# Patient Record
Sex: FEMALE
[De-identification: names, ages, dates, MRNs, and addresses within clinical notes are randomized per-mention and may not be internally consistent; named-entity substitution may affect disease eponyms.]

---

## 2017-08-28 ENCOUNTER — APPOINTMENT (OUTPATIENT)
Dept: INTERNAL MEDICINE | Facility: CLINIC | Age: 27
End: 2017-08-28
Payer: COMMERCIAL

## 2017-08-28 ENCOUNTER — LABORATORY RESULT (OUTPATIENT)
Age: 27
End: 2017-08-28

## 2017-08-28 VITALS
OXYGEN SATURATION: 99 % | HEART RATE: 77 BPM | SYSTOLIC BLOOD PRESSURE: 108 MMHG | HEIGHT: 63.5 IN | BODY MASS INDEX: 21.7 KG/M2 | DIASTOLIC BLOOD PRESSURE: 62 MMHG | WEIGHT: 124 LBS | TEMPERATURE: 98.6 F

## 2017-08-28 DIAGNOSIS — Z80.6 FAMILY HISTORY OF LEUKEMIA: ICD-10-CM

## 2017-08-28 DIAGNOSIS — Z83.42 FAMILY HISTORY OF FAMILIAL HYPERCHOLESTEROLEMIA: ICD-10-CM

## 2017-08-28 DIAGNOSIS — Z97.3 PRESENCE OF SPECTACLES AND CONTACT LENSES: ICD-10-CM

## 2017-08-28 PROCEDURE — 99213 OFFICE O/P EST LOW 20 MIN: CPT | Mod: 25

## 2017-08-28 PROCEDURE — 99385 PREV VISIT NEW AGE 18-39: CPT | Mod: 25

## 2017-08-28 PROCEDURE — 36415 COLL VENOUS BLD VENIPUNCTURE: CPT

## 2017-08-28 RX ORDER — EZETIMIBE 10 MG/1
10 TABLET ORAL
Refills: 0 | Status: ACTIVE | COMMUNITY

## 2017-08-28 RX ORDER — DOXYCYCLINE HYCLATE 100 MG/1
100 CAPSULE ORAL
Qty: 10 | Refills: 0 | Status: COMPLETED | COMMUNITY
Start: 2017-03-01

## 2017-08-28 RX ORDER — AZITHROMYCIN 500 MG/1
500 TABLET, FILM COATED ORAL
Qty: 3 | Refills: 0 | Status: COMPLETED | COMMUNITY
Start: 2017-02-28

## 2017-08-29 LAB
25(OH)D3 SERPL-MCNC: 31 NG/ML
ALBUMIN SERPL ELPH-MCNC: 4.7 G/DL
ALP BLD-CCNC: 67 U/L
ALT SERPL-CCNC: 29 U/L
ANION GAP SERPL CALC-SCNC: 19 MMOL/L
AST SERPL-CCNC: 41 U/L
BASOPHILS # BLD AUTO: 0.06 K/UL
BASOPHILS NFR BLD AUTO: 0.9 %
BILIRUB SERPL-MCNC: 0.2 MG/DL
BUN SERPL-MCNC: 17 MG/DL
CALCIUM SERPL-MCNC: 9.6 MG/DL
CHLORIDE SERPL-SCNC: 102 MMOL/L
CHOLEST SERPL-MCNC: 221 MG/DL
CHOLEST/HDLC SERPL: 4 RATIO
CK SERPL-CCNC: 134 U/L
CO2 SERPL-SCNC: 22 MMOL/L
CREAT SERPL-MCNC: 0.62 MG/DL
EOSINOPHIL # BLD AUTO: 0.24 K/UL
EOSINOPHIL NFR BLD AUTO: 3.5 %
GLUCOSE SERPL-MCNC: 66 MG/DL
HCT VFR BLD CALC: 32.8 %
HDLC SERPL-MCNC: 55 MG/DL
HGB BLD-MCNC: 8.7 G/DL
LDLC SERPL CALC-MCNC: 150 MG/DL
LYMPHOCYTES # BLD AUTO: 1.06 K/UL
LYMPHOCYTES NFR BLD AUTO: 15.7 %
MAN DIFF?: NORMAL
MCHC RBC-ENTMCNC: 18.4 PG
MCHC RBC-ENTMCNC: 26.5 GM/DL
MCV RBC AUTO: 69.3 FL
MONOCYTES # BLD AUTO: 0.41 K/UL
MONOCYTES NFR BLD AUTO: 6.1 %
NEUTROPHILS # BLD AUTO: 4.91 K/UL
NEUTROPHILS NFR BLD AUTO: 73 %
PLATELET # BLD AUTO: 427 K/UL
POTASSIUM SERPL-SCNC: 5.3 MMOL/L
PROT SERPL-MCNC: 7.5 G/DL
RBC # BLD: 4.73 M/UL
RBC # FLD: 24.7 %
SODIUM SERPL-SCNC: 143 MMOL/L
TRIGL SERPL-MCNC: 80 MG/DL
TSH SERPL-ACNC: 1.11 UIU/ML
WBC # FLD AUTO: 6.73 K/UL

## 2017-09-25 ENCOUNTER — RX RENEWAL (OUTPATIENT)
Age: 27
End: 2017-09-25

## 2018-05-22 ENCOUNTER — APPOINTMENT (OUTPATIENT)
Dept: INTERNAL MEDICINE | Facility: CLINIC | Age: 28
End: 2018-05-22
Payer: COMMERCIAL

## 2018-05-22 VITALS
HEIGHT: 63 IN | SYSTOLIC BLOOD PRESSURE: 100 MMHG | HEART RATE: 73 BPM | TEMPERATURE: 97.3 F | WEIGHT: 129 LBS | DIASTOLIC BLOOD PRESSURE: 65 MMHG | OXYGEN SATURATION: 98 % | BODY MASS INDEX: 22.86 KG/M2

## 2018-05-22 DIAGNOSIS — Z71.89 OTHER SPECIFIED COUNSELING: ICD-10-CM

## 2018-05-22 PROCEDURE — 99214 OFFICE O/P EST MOD 30 MIN: CPT

## 2018-05-22 RX ORDER — DOXYCYCLINE HYCLATE 100 MG/1
100 CAPSULE ORAL DAILY
Qty: 30 | Refills: 2 | Status: COMPLETED | COMMUNITY
Start: 2017-08-28 | End: 2018-05-22

## 2018-05-22 NOTE — PHYSICAL EXAM
[No Acute Distress] : no acute distress [Well Nourished] : well nourished [Well Developed] : well developed [Well-Appearing] : well-appearing [Normal Sclera/Conjunctiva] : normal sclera/conjunctiva [Normal Outer Ear/Nose] : the outer ears and nose were normal in appearance [No Joint Swelling] : no joint swelling [No Rash] : no rash [Normal Gait] : normal gait [No Focal Deficits] : no focal deficits [Memory Grossly Normal] : memory grossly normal [Normal Affect] : the affect was normal [Alert and Oriented x3] : oriented to person, place, and time [Normal Mood] : the mood was normal [Normal Insight/Judgement] : insight and judgment were intact

## 2018-05-22 NOTE — COUNSELING
[Behavioral health counseling provided] : behavioral health  [Engage in a relaxing activity] : Engage in a relaxing activity [Good understanding] : Patient has a good understanding of lifestyle changes and the steps needed to achieve self management goals

## 2018-05-22 NOTE — ASSESSMENT
[FreeTextEntry1] : 26 y/o female is here for f/u on insomnia. We spent a long time discussing the different classes of sleep aides and how they work with regard to treating comobidities.\par She has no psych history and I don't think BENZOs are a good option here. She doesn't like AMbien and is thus hesitant to try Lunesta.\par Remeron isn't a great drug in her demographic.\par That leaves us with Trazadone which she would like to try.

## 2018-05-22 NOTE — HEALTH RISK ASSESSMENT
[No falls in past year] : Patient reported no falls in the past year [0] : 2) Feeling down, depressed, or hopeless: Not at all (0) [[id number]] : 0

## 2018-05-22 NOTE — HISTORY OF PRESENT ILLNESS
[de-identified] : 28 y/o female with insomnia is here for f/u.\par SHe had CPE in 8/17. \par SHe was given Ambien in August. She takes it several times per week but doesn't like how she feels the next am=groggy, etc.\par She finds melatonin, cherry extract, Unisom, Benadryl ineffective.\par She wants a "different class of medications," She saw an advertisement for remeron on TV. \par She has no psych history and no sxs of depression/anxiety.\par \par

## 2018-06-29 ENCOUNTER — MOBILE ON CALL (OUTPATIENT)
Age: 28
End: 2018-06-29

## 2018-07-02 ENCOUNTER — APPOINTMENT (OUTPATIENT)
Dept: INTERNAL MEDICINE | Facility: CLINIC | Age: 28
End: 2018-07-02
Payer: COMMERCIAL

## 2018-07-02 VITALS
RESPIRATION RATE: 14 BRPM | DIASTOLIC BLOOD PRESSURE: 70 MMHG | OXYGEN SATURATION: 98 % | TEMPERATURE: 98.3 F | SYSTOLIC BLOOD PRESSURE: 110 MMHG | HEART RATE: 80 BPM

## 2018-07-02 PROCEDURE — 99213 OFFICE O/P EST LOW 20 MIN: CPT

## 2018-07-02 RX ORDER — ZOLPIDEM TARTRATE 5 MG/1
5 TABLET ORAL
Qty: 30 | Refills: 5 | Status: COMPLETED | COMMUNITY
Start: 2017-08-28 | End: 2018-07-02

## 2018-07-02 RX ORDER — DROSPIRENONE, ETHINYL ESTRADIOL AND LEVOMEFOLATE CALCIUM AND LEVOMEFOLATE CALCIUM 3-0.02(24)
3-0.02-0.451 KIT ORAL
Qty: 28 | Refills: 0 | Status: COMPLETED | COMMUNITY
Start: 2016-07-15 | End: 2018-07-02

## 2018-07-02 NOTE — PHYSICAL EXAM
[No Acute Distress] : no acute distress [Well Nourished] : well nourished [Normal Sclera/Conjunctiva] : normal sclera/conjunctiva [Normal Gait] : normal gait [Normal Affect] : the affect was normal [Alert and Oriented x3] : oriented to person, place, and time

## 2018-07-02 NOTE — HISTORY OF PRESENT ILLNESS
[de-identified] : 28 y/o female with insomnia is here for f/u. She increased her trazodone to 100 mg and it is very effective. SHe is here now for refills. She has no other complaints.

## 2018-08-28 ENCOUNTER — OTHER (OUTPATIENT)
Age: 28
End: 2018-08-28

## 2018-08-29 ENCOUNTER — RX RENEWAL (OUTPATIENT)
Age: 28
End: 2018-08-29

## 2018-12-08 ENCOUNTER — MOBILE ON CALL (OUTPATIENT)
Age: 28
End: 2018-12-08

## 2018-12-11 ENCOUNTER — RX RENEWAL (OUTPATIENT)
Age: 28
End: 2018-12-11

## 2019-02-06 ENCOUNTER — RX RENEWAL (OUTPATIENT)
Age: 29
End: 2019-02-06

## 2019-02-28 ENCOUNTER — RX RENEWAL (OUTPATIENT)
Age: 29
End: 2019-02-28

## 2019-03-14 ENCOUNTER — APPOINTMENT (OUTPATIENT)
Dept: INTERNAL MEDICINE | Facility: CLINIC | Age: 29
End: 2019-03-14
Payer: COMMERCIAL

## 2019-03-14 VITALS
SYSTOLIC BLOOD PRESSURE: 100 MMHG | HEART RATE: 50 BPM | DIASTOLIC BLOOD PRESSURE: 58 MMHG | OXYGEN SATURATION: 99 % | HEIGHT: 63 IN | TEMPERATURE: 97.5 F | BODY MASS INDEX: 21.68 KG/M2 | WEIGHT: 122.38 LBS

## 2019-03-14 DIAGNOSIS — Z82.61 FAMILY HISTORY OF ARTHRITIS: ICD-10-CM

## 2019-03-14 PROCEDURE — 99395 PREV VISIT EST AGE 18-39: CPT | Mod: 25

## 2019-03-14 PROCEDURE — 99213 OFFICE O/P EST LOW 20 MIN: CPT | Mod: 25

## 2019-03-14 PROCEDURE — 36415 COLL VENOUS BLD VENIPUNCTURE: CPT

## 2019-03-14 NOTE — COUNSELING
[Weight management counseling provided] : Weight management [Healthy eating counseling provided] : healthy eating [Activity counseling provided] : activity [Behavioral health counseling provided] : behavioral health  [Fall prevention counseling provided] : fall prevention  [None] : None

## 2019-03-14 NOTE — HISTORY OF PRESENT ILLNESS
[de-identified] : 27 y/o female is here for CPE.\par Takes multiple lipid lowering agents given by cardiology.\par Takes Trazodone nightly for insomnia. Has developed dry eyes. OPhtho trying "drops" and different contacts. No dry mouth, rash, joint pains. Mom has RA.

## 2019-03-14 NOTE — REVIEW OF SYSTEMS
[Negative] : Heme/Lymph [Pain] : no pain [Redness] : no redness [Dryness] : dryness  [Itching] : no itching

## 2019-03-14 NOTE — HEALTH RISK ASSESSMENT
[Very Good] : ~his/her~  mood as very good [Patient reported PAP Smear was normal] : Patient reported PAP Smear was normal [0] : 2) Feeling down, depressed, or hopeless: Not at all (0) [[id number]] : 0 [Change in mental status noted] : No change in mental status noted [Language] : denies difficulty with language [Behavior] : denies difficulty with behavior [Learning/Retaining New Information] : denies difficulty learning/retaining new information [Handling Complex Tasks] : denies difficulty handling complex tasks [Reasoning] : denies difficulty with reasoning [Spatial Ability and Orientation] : denies difficulty with spatial ability and orientation [PapSmearDate] : 07/18

## 2019-03-14 NOTE — ASSESSMENT
[FreeTextEntry1] : 28 year is here for a CPE. She was counselled on diet and exercise, drug and alcohol use, age appropriate health care maintenance including vaccines, seatbelts, sunscreen, stress reduction and safe sex. All questions asked/answered to the best of my ability.\par Labs sent including autoimmune panel given dry eye.\par Dry eye could also be side effect of the trazodone which I refilled for her.

## 2019-03-18 LAB
25(OH)D3 SERPL-MCNC: 22.1 NG/ML
ALBUMIN SERPL ELPH-MCNC: 5 G/DL
ALP BLD-CCNC: 58 U/L
ALT SERPL-CCNC: 33 U/L
ANION GAP SERPL CALC-SCNC: 19 MMOL/L
AST SERPL-CCNC: 35 U/L
BASOPHILS # BLD AUTO: 0.03 K/UL
BASOPHILS NFR BLD AUTO: 0.9 %
BILIRUB SERPL-MCNC: 0.2 MG/DL
BUN SERPL-MCNC: 18 MG/DL
CALCIUM SERPL-MCNC: 9.7 MG/DL
CHLORIDE SERPL-SCNC: 104 MMOL/L
CHOLEST SERPL-MCNC: 103 MG/DL
CHOLEST/HDLC SERPL: 2 RATIO
CO2 SERPL-SCNC: 16 MMOL/L
CREAT SERPL-MCNC: 0.49 MG/DL
ENA SS-A AB SER IA-ACNC: <0.2 AL
ENA SS-B AB SER IA-ACNC: <0.2 AL
EOSINOPHIL # BLD AUTO: 0.05 K/UL
EOSINOPHIL NFR BLD AUTO: 1.4 %
GLUCOSE SERPL-MCNC: 74 MG/DL
HCT VFR BLD CALC: 41.1 %
HDLC SERPL-MCNC: 51 MG/DL
HGB BLD-MCNC: 12.8 G/DL
IMM GRANULOCYTES NFR BLD AUTO: 0 %
LDLC SERPL CALC-MCNC: 43 MG/DL
LYMPHOCYTES # BLD AUTO: 1.09 K/UL
LYMPHOCYTES NFR BLD AUTO: 31.5 %
MAN DIFF?: NORMAL
MCHC RBC-ENTMCNC: 30.5 PG
MCHC RBC-ENTMCNC: 31.1 GM/DL
MCV RBC AUTO: 98.1 FL
MONOCYTES # BLD AUTO: 0.25 K/UL
MONOCYTES NFR BLD AUTO: 7.2 %
NEUTROPHILS # BLD AUTO: 2.04 K/UL
NEUTROPHILS NFR BLD AUTO: 59 %
PLATELET # BLD AUTO: 347 K/UL
POTASSIUM SERPL-SCNC: 3.9 MMOL/L
PROT SERPL-MCNC: 7.5 G/DL
RBC # BLD: 4.19 M/UL
RBC # FLD: 12.9 %
RHEUMATOID FACT SER QL: <10 IU/ML
SODIUM SERPL-SCNC: 139 MMOL/L
TRIGL SERPL-MCNC: 45 MG/DL
TSH SERPL-ACNC: 0.99 UIU/ML
WBC # FLD AUTO: 3.46 K/UL

## 2019-03-19 LAB — ANA SER IF-ACNC: NEGATIVE

## 2019-06-10 ENCOUNTER — CHART COPY (OUTPATIENT)
Age: 29
End: 2019-06-10

## 2019-08-13 ENCOUNTER — CLINICAL ADVICE (OUTPATIENT)
Age: 29
End: 2019-08-13

## 2019-08-21 ENCOUNTER — RX RENEWAL (OUTPATIENT)
Age: 29
End: 2019-08-21

## 2019-09-16 ENCOUNTER — RX CHANGE (OUTPATIENT)
Age: 29
End: 2019-09-16

## 2019-09-16 ENCOUNTER — APPOINTMENT (OUTPATIENT)
Dept: INTERNAL MEDICINE | Facility: CLINIC | Age: 29
End: 2019-09-16
Payer: COMMERCIAL

## 2019-09-16 VITALS
BODY MASS INDEX: 21.09 KG/M2 | SYSTOLIC BLOOD PRESSURE: 100 MMHG | WEIGHT: 119 LBS | TEMPERATURE: 97.7 F | HEIGHT: 63 IN | OXYGEN SATURATION: 98 % | HEART RATE: 52 BPM | DIASTOLIC BLOOD PRESSURE: 62 MMHG

## 2019-09-16 DIAGNOSIS — H04.129 DRY EYE SYNDROME OF UNSPECIFIED LACRIMAL GLAND: ICD-10-CM

## 2019-09-16 PROCEDURE — G0008: CPT

## 2019-09-16 PROCEDURE — 90686 IIV4 VACC NO PRSV 0.5 ML IM: CPT

## 2019-09-16 PROCEDURE — 99213 OFFICE O/P EST LOW 20 MIN: CPT | Mod: 25

## 2019-09-16 RX ORDER — TRAZODONE HYDROCHLORIDE 100 MG/1
100 TABLET ORAL
Qty: 30 | Refills: 0 | Status: COMPLETED | COMMUNITY
Start: 2018-05-22 | End: 2019-09-16

## 2019-09-16 RX ORDER — CYCLOSPORINE 0.5 MG/ML
0.05 EMULSION OPHTHALMIC
Refills: 0 | Status: ACTIVE | COMMUNITY

## 2019-09-16 RX ORDER — UBIDECARENONE/VIT E ACET 100MG-5
1000 CAPSULE ORAL
Refills: 0 | Status: ACTIVE | COMMUNITY

## 2019-09-16 NOTE — HISTORY OF PRESENT ILLNESS
[de-identified] : 27 y/o female with insomnia is here for f/u.\par Needs trazodone refilled this AM.\par Was causing dry eyes but now on Restasis and symptoms improved.\par Wants flu shot.\par

## 2019-09-16 NOTE — PHYSICAL EXAM
[Normal] : normal rate, regular rhythm, normal S1 and S2 and no murmur heard [No Edema] : there was no peripheral edema [Normal Affect] : the affect was normal [Alert and Oriented x3] : oriented to person, place, and time

## 2019-09-16 NOTE — ASSESSMENT
[FreeTextEntry1] : 29 y/o female with insomnia is here for trazodone refilled.\par FLu shot given.\par

## 2019-10-14 ENCOUNTER — RX RENEWAL (OUTPATIENT)
Age: 29
End: 2019-10-14

## 2019-11-11 ENCOUNTER — RX CHANGE (OUTPATIENT)
Age: 29
End: 2019-11-11

## 2019-11-11 RX ORDER — TRAZODONE HYDROCHLORIDE 100 MG/1
100 TABLET ORAL
Qty: 30 | Refills: 5 | Status: DISCONTINUED | COMMUNITY
Start: 2019-09-16 | End: 2019-11-11

## 2019-12-03 ENCOUNTER — RX RENEWAL (OUTPATIENT)
Age: 29
End: 2019-12-03

## 2019-12-17 ENCOUNTER — CHART COPY (OUTPATIENT)
Age: 29
End: 2019-12-17

## 2020-02-28 ENCOUNTER — NON-APPOINTMENT (OUTPATIENT)
Age: 30
End: 2020-02-28

## 2020-04-10 ENCOUNTER — RX RENEWAL (OUTPATIENT)
Age: 30
End: 2020-04-10

## 2020-04-21 ENCOUNTER — APPOINTMENT (OUTPATIENT)
Dept: INTERNAL MEDICINE | Facility: CLINIC | Age: 30
End: 2020-04-21
Payer: COMMERCIAL

## 2020-04-21 PROCEDURE — 99213 OFFICE O/P EST LOW 20 MIN: CPT | Mod: 95

## 2020-04-21 RX ORDER — DOXYCYCLINE HYCLATE 100 MG/1
100 TABLET ORAL
Qty: 30 | Refills: 0 | Status: COMPLETED | COMMUNITY
Start: 2020-02-28 | End: 2020-04-21

## 2020-04-21 RX ORDER — DOXYCYCLINE HYCLATE 100 MG/1
100 TABLET ORAL
Qty: 30 | Refills: 1 | Status: COMPLETED | COMMUNITY
Start: 2019-09-19 | End: 2020-04-21

## 2020-04-21 RX ORDER — EVOLOCUMAB 140 MG/ML
140 INJECTION, SOLUTION SUBCUTANEOUS
Refills: 0 | Status: COMPLETED | COMMUNITY
End: 2020-04-21

## 2020-04-21 RX ORDER — ALIROCUMAB 150 MG/ML
150 INJECTION, SOLUTION SUBCUTANEOUS
Qty: 2 | Refills: 0 | Status: DISCONTINUED | COMMUNITY
Start: 2019-12-08

## 2020-04-21 NOTE — ASSESSMENT
[FreeTextEntry1] : 30 y/o female with insomnia needs f/u.\par Doing well on Trazadone. Continue current dose.

## 2020-04-21 NOTE — HISTORY OF PRESENT ILLNESS
[Home] : at home, [unfilled] , at the time of the visit. [Other Location: e.g. Home (Enter Location, City,State)___] : at [unfilled] [Patient] : the patient [de-identified] : Pt with insomnia needs trazadone refilled.\par Doing well. Takes nightly. Mood stable.  [Self] : self

## 2020-04-21 NOTE — PHYSICAL EXAM
[No Acute Distress] : no acute distress [Well Nourished] : well nourished [Normal Sclera/Conjunctiva] : normal sclera/conjunctiva [No Respiratory Distress] : no respiratory distress  [Normal Outer Ear/Nose] : the outer ears and nose were normal in appearance [Normal Affect] : the affect was normal [No Accessory Muscle Use] : no accessory muscle use [de-identified] : sylvia glasses

## 2020-08-24 ENCOUNTER — RX RENEWAL (OUTPATIENT)
Age: 30
End: 2020-08-24

## 2020-11-12 ENCOUNTER — RX RENEWAL (OUTPATIENT)
Age: 30
End: 2020-11-12

## 2020-12-14 ENCOUNTER — LABORATORY RESULT (OUTPATIENT)
Age: 30
End: 2020-12-14

## 2020-12-14 ENCOUNTER — APPOINTMENT (OUTPATIENT)
Dept: INTERNAL MEDICINE | Facility: CLINIC | Age: 30
End: 2020-12-14
Payer: COMMERCIAL

## 2020-12-14 VITALS
OXYGEN SATURATION: 99 % | TEMPERATURE: 98.1 F | HEART RATE: 51 BPM | DIASTOLIC BLOOD PRESSURE: 56 MMHG | HEIGHT: 63 IN | WEIGHT: 115.25 LBS | SYSTOLIC BLOOD PRESSURE: 94 MMHG | BODY MASS INDEX: 20.42 KG/M2

## 2020-12-14 DIAGNOSIS — Z29.8 ENCOUNTER FOR OTHER SPECIFIED PROPHYLACTIC MEASURES: ICD-10-CM

## 2020-12-14 DIAGNOSIS — Z78.9 OTHER SPECIFIED HEALTH STATUS: ICD-10-CM

## 2020-12-14 PROCEDURE — 99395 PREV VISIT EST AGE 18-39: CPT | Mod: 25

## 2020-12-14 PROCEDURE — 90686 IIV4 VACC NO PRSV 0.5 ML IM: CPT

## 2020-12-14 PROCEDURE — G0008: CPT

## 2020-12-14 PROCEDURE — 36415 COLL VENOUS BLD VENIPUNCTURE: CPT

## 2020-12-14 PROCEDURE — 99072 ADDL SUPL MATRL&STAF TM PHE: CPT

## 2020-12-14 RX ORDER — ROSUVASTATIN CALCIUM 20 MG/1
20 TABLET, FILM COATED ORAL
Qty: 30 | Refills: 0 | Status: ACTIVE | COMMUNITY
Start: 2020-10-23

## 2020-12-14 RX ORDER — ROSUVASTATIN CALCIUM 40 MG/1
40 TABLET, FILM COATED ORAL
Refills: 0 | Status: COMPLETED | COMMUNITY
End: 2020-12-14

## 2020-12-14 RX ORDER — DOXYCYCLINE HYCLATE 50 MG/1
50 CAPSULE ORAL
Qty: 60 | Refills: 0 | Status: COMPLETED | COMMUNITY
Start: 2020-12-03

## 2020-12-14 NOTE — ASSESSMENT
[FreeTextEntry1] : 30 year is here for a CPE. She was counselled on diet and exercise, drug and alcohol use, age appropriate health care maintenance including vaccines, seatbelts, sunscreen, stress reduction and safe sex. All questions asked/answered to the best of my ability.\par

## 2020-12-14 NOTE — HISTORY OF PRESENT ILLNESS
[de-identified] : 31 y/o female presents for CPE.\par Om multiple lipid lowering meds. LFTs had bumped up on high dose Crestor but she had her dose decreased and they came down again.\par Wants flu shot.\par Otherwise, no issues.

## 2020-12-14 NOTE — HEALTH RISK ASSESSMENT
[Good] : ~his/her~  mood as  good [No falls in past year] : Patient reported no falls in the past year [0] : 2) Feeling down, depressed, or hopeless: Not at all (0) [Patient reported PAP Smear was normal] : Patient reported PAP Smear was normal [HIV test declined] : HIV test declined [Hepatitis C test declined] : Hepatitis C test declined [Change in mental status noted] : No change in mental status noted [Language] : denies difficulty with language [Behavior] : denies difficulty with behavior [Learning/Retaining New Information] : denies difficulty learning/retaining new information [Handling Complex Tasks] : denies difficulty handling complex tasks [Reasoning] : denies difficulty with reasoning [Spatial Ability and Orientation] : denies difficulty with spatial ability and orientation [PapSmearDate] : 2019

## 2020-12-15 LAB
25(OH)D3 SERPL-MCNC: 43.5 NG/ML
ALBUMIN SERPL ELPH-MCNC: 5 G/DL
ALP BLD-CCNC: 67 U/L
ALT SERPL-CCNC: 26 U/L
ANION GAP SERPL CALC-SCNC: 15 MMOL/L
AST SERPL-CCNC: 36 U/L
BASOPHILS # BLD AUTO: 0.05 K/UL
BASOPHILS NFR BLD AUTO: 1.7 %
BILIRUB SERPL-MCNC: 0.3 MG/DL
BUN SERPL-MCNC: 13 MG/DL
CALCIUM SERPL-MCNC: 9.3 MG/DL
CHLORIDE SERPL-SCNC: 98 MMOL/L
CHOLEST SERPL-MCNC: 131 MG/DL
CK SERPL-CCNC: 178 U/L
CO2 SERPL-SCNC: 19 MMOL/L
CREAT SERPL-MCNC: 0.61 MG/DL
EOSINOPHIL # BLD AUTO: 0.06 K/UL
EOSINOPHIL NFR BLD AUTO: 2.1 %
ESTIMATED AVERAGE GLUCOSE: 105 MG/DL
GLUCOSE SERPL-MCNC: 67 MG/DL
HBA1C MFR BLD HPLC: 5.3 %
HCT VFR BLD CALC: 37.9 %
HDLC SERPL-MCNC: 65 MG/DL
HGB BLD-MCNC: 11.8 G/DL
IMM GRANULOCYTES NFR BLD AUTO: 0 %
LDLC SERPL CALC-MCNC: 58 MG/DL
LYMPHOCYTES # BLD AUTO: 1.05 K/UL
LYMPHOCYTES NFR BLD AUTO: 36.7 %
MAN DIFF?: NORMAL
MCHC RBC-ENTMCNC: 28.8 PG
MCHC RBC-ENTMCNC: 31.1 GM/DL
MCV RBC AUTO: 92.4 FL
MEV IGG FLD QL IA: 270 AU/ML
MEV IGG+IGM SER-IMP: POSITIVE
MONOCYTES # BLD AUTO: 0.31 K/UL
MONOCYTES NFR BLD AUTO: 10.8 %
NEUTROPHILS # BLD AUTO: 1.39 K/UL
NEUTROPHILS NFR BLD AUTO: 48.7 %
NONHDLC SERPL-MCNC: 66 MG/DL
PLATELET # BLD AUTO: 350 K/UL
POTASSIUM SERPL-SCNC: 4.3 MMOL/L
PROT SERPL-MCNC: 6.9 G/DL
RBC # BLD: 4.1 M/UL
RBC # FLD: 13.2 %
SODIUM SERPL-SCNC: 132 MMOL/L
TRIGL SERPL-MCNC: 44 MG/DL
TSH SERPL-ACNC: 1.02 UIU/ML
VIT B12 SERPL-MCNC: 1905 PG/ML
WBC # FLD AUTO: 2.86 K/UL

## 2020-12-17 ENCOUNTER — RX RENEWAL (OUTPATIENT)
Age: 30
End: 2020-12-17

## 2021-08-06 ENCOUNTER — NON-APPOINTMENT (OUTPATIENT)
Age: 31
End: 2021-08-06

## 2021-08-09 ENCOUNTER — APPOINTMENT (OUTPATIENT)
Dept: INTERNAL MEDICINE | Facility: CLINIC | Age: 31
End: 2021-08-09
Payer: COMMERCIAL

## 2021-08-09 VITALS
HEART RATE: 63 BPM | BODY MASS INDEX: 20.38 KG/M2 | TEMPERATURE: 97.9 F | WEIGHT: 115 LBS | OXYGEN SATURATION: 100 % | SYSTOLIC BLOOD PRESSURE: 98 MMHG | HEIGHT: 63 IN | DIASTOLIC BLOOD PRESSURE: 64 MMHG

## 2021-08-09 DIAGNOSIS — R74.01 ELEVATION OF LEVELS OF LIVER TRANSAMINASE LEVELS: ICD-10-CM

## 2021-08-09 PROCEDURE — 99072 ADDL SUPL MATRL&STAF TM PHE: CPT

## 2021-08-09 PROCEDURE — 36415 COLL VENOUS BLD VENIPUNCTURE: CPT

## 2021-08-09 PROCEDURE — 99213 OFFICE O/P EST LOW 20 MIN: CPT | Mod: 25

## 2021-08-09 RX ORDER — EVOLOCUMAB 140 MG/ML
140 INJECTION, SOLUTION SUBCUTANEOUS
Qty: 2 | Refills: 0 | Status: ACTIVE | COMMUNITY
Start: 2021-02-03

## 2021-08-09 RX ORDER — DOXYCYCLINE 100 MG/1
100 TABLET, FILM COATED ORAL
Qty: 30 | Refills: 0 | Status: COMPLETED | COMMUNITY
Start: 2021-07-23 | End: 2021-08-09

## 2021-08-09 RX ORDER — METHYLPREDNISOLONE 4 MG/1
4 TABLET ORAL
Qty: 21 | Refills: 0 | Status: COMPLETED | COMMUNITY
Start: 2021-04-29

## 2021-08-09 RX ORDER — ERYTHROMYCIN 5 MG/G
5 OINTMENT OPHTHALMIC
Qty: 3 | Refills: 0 | Status: COMPLETED | COMMUNITY
Start: 2021-02-10

## 2021-08-09 RX ORDER — AZITHROMYCIN MONOHYDRATE 10 MG/ML
1 SOLUTION/ DROPS OPHTHALMIC
Qty: 2 | Refills: 0 | Status: ACTIVE | COMMUNITY
Start: 2021-03-11

## 2021-08-09 RX ORDER — KETOROLAC TROMETHAMINE 5 MG/ML
0.5 SOLUTION OPHTHALMIC
Qty: 10 | Refills: 0 | Status: COMPLETED | COMMUNITY
Start: 2021-03-11

## 2021-08-09 RX ORDER — IVERMECTIN 10 MG/G
1 CREAM TOPICAL
Qty: 45 | Refills: 0 | Status: COMPLETED | COMMUNITY
Start: 2021-02-24 | End: 2021-08-09

## 2021-08-09 RX ORDER — TRETINOIN 0.5 MG/G
0.05 CREAM TOPICAL
Qty: 20 | Refills: 0 | Status: COMPLETED | COMMUNITY
Start: 2021-02-24

## 2021-08-09 RX ORDER — CEVIMELINE HYDROCHLORIDE 30 MG/1
30 CAPSULE ORAL
Qty: 90 | Refills: 0 | Status: COMPLETED | COMMUNITY
Start: 2021-02-22

## 2021-08-09 RX ORDER — DULOXETINE HYDROCHLORIDE 60 MG/1
60 CAPSULE, DELAYED RELEASE PELLETS ORAL
Qty: 30 | Refills: 0 | Status: COMPLETED | COMMUNITY
Start: 2021-04-01 | End: 2021-08-09

## 2021-08-09 RX ORDER — FLUTICASONE PROPIONATE 50 UG/1
50 SPRAY, METERED NASAL
Qty: 16 | Refills: 0 | Status: ACTIVE | COMMUNITY
Start: 2021-03-01

## 2021-08-09 NOTE — HISTORY OF PRESENT ILLNESS
[de-identified] : 31 y/o female presents for f/u.\par On Trazodone for insmonia.\par Had mild hyponatremia at last check.\par Feels well.\par

## 2021-08-09 NOTE — HEALTH RISK ASSESSMENT
[0] : 2) Feeling down, depressed, or hopeless: Not at all (0) [PHQ-2 Negative - No further assessment needed] : PHQ-2 Negative - No further assessment needed [[id number]] : 0

## 2021-08-09 NOTE — ASSESSMENT
[FreeTextEntry1] : Saleem well.\par Needs Trazodone refill\par Labs sent/venopuncutre performed-need for f/u CMP.\par

## 2021-08-10 LAB
ALBUMIN SERPL ELPH-MCNC: 4.5 G/DL
ALP BLD-CCNC: 60 U/L
ALT SERPL-CCNC: 27 U/L
ANION GAP SERPL CALC-SCNC: 12 MMOL/L
AST SERPL-CCNC: 34 U/L
BILIRUB SERPL-MCNC: <0.2 MG/DL
BUN SERPL-MCNC: 14 MG/DL
CALCIUM SERPL-MCNC: 9.3 MG/DL
CHLORIDE SERPL-SCNC: 100 MMOL/L
CO2 SERPL-SCNC: 24 MMOL/L
CREAT SERPL-MCNC: 0.48 MG/DL
GLUCOSE SERPL-MCNC: 82 MG/DL
POTASSIUM SERPL-SCNC: 4.5 MMOL/L
PROT SERPL-MCNC: 6.3 G/DL
SODIUM SERPL-SCNC: 135 MMOL/L

## 2021-11-09 ENCOUNTER — RX RENEWAL (OUTPATIENT)
Age: 31
End: 2021-11-09

## 2022-02-10 ENCOUNTER — APPOINTMENT (OUTPATIENT)
Dept: INTERNAL MEDICINE | Facility: CLINIC | Age: 32
End: 2022-02-10
Payer: COMMERCIAL

## 2022-02-10 VITALS
WEIGHT: 111 LBS | DIASTOLIC BLOOD PRESSURE: 65 MMHG | TEMPERATURE: 98.2 F | SYSTOLIC BLOOD PRESSURE: 99 MMHG | OXYGEN SATURATION: 96 % | BODY MASS INDEX: 19.67 KG/M2 | HEIGHT: 63 IN | HEART RATE: 69 BPM

## 2022-02-10 DIAGNOSIS — Z23 ENCOUNTER FOR IMMUNIZATION: ICD-10-CM

## 2022-02-10 PROCEDURE — G0442 ANNUAL ALCOHOL SCREEN 15 MIN: CPT

## 2022-02-10 PROCEDURE — G0008: CPT

## 2022-02-10 PROCEDURE — 36415 COLL VENOUS BLD VENIPUNCTURE: CPT

## 2022-02-10 PROCEDURE — 99395 PREV VISIT EST AGE 18-39: CPT | Mod: 25

## 2022-02-10 PROCEDURE — 99072 ADDL SUPL MATRL&STAF TM PHE: CPT

## 2022-02-10 PROCEDURE — 99212 OFFICE O/P EST SF 10 MIN: CPT | Mod: 25

## 2022-02-10 PROCEDURE — 90686 IIV4 VACC NO PRSV 0.5 ML IM: CPT

## 2022-02-10 RX ORDER — PILOCARPINE HYDROCHLORIDE 5 MG/1
5 TABLET, FILM COATED ORAL
Qty: 90 | Refills: 0 | Status: COMPLETED | COMMUNITY
Start: 2021-03-11 | End: 2022-02-10

## 2022-02-10 RX ORDER — ALIROCUMAB 150 MG/ML
150 INJECTION, SOLUTION SUBCUTANEOUS
Refills: 0 | Status: COMPLETED | COMMUNITY
End: 2022-02-10

## 2022-02-10 NOTE — ASSESSMENT
[FreeTextEntry1] : 31 year is here for a CPE. She was counselled on diet and exercise, drug and alcohol use, age appropriate health care maintenance including vaccines, seatbelts, sunscreen, stress reduction and safe sex. All questions asked/answered to the best of my ability.\par Labs sent including urine studies.\par Etilogy of hyponatremia unclear. None of her meds appear to cause. If no clear cause with my w/u, will send to Nephrologist.\par Flu shot given.\par Pt is not at risk felicia.

## 2022-02-10 NOTE — HEALTH RISK ASSESSMENT
[Good] : ~his/her~  mood as  good [Never] : Never [Yes] : Yes [4 or more  times a week (4 pts)] : 4 or more  times a week (4 points) [1 or 2 (0 pts)] : 1 or 2 (0 points) [Never (0 pts)] : Never (0 points) [No] : In the past 12 months have you used drugs other than those required for medical reasons? No [No falls in past year] : Patient reported no falls in the past year [0] : 2) Feeling down, depressed, or hopeless: Not at all (0) [PHQ-2 Negative - No further assessment needed] : PHQ-2 Negative - No further assessment needed [Patient reported PAP Smear was normal] : Patient reported PAP Smear was normal [HIV test declined] : HIV test declined [Audit-CScore] : 4 [[id number]] : 0 [Change in mental status noted] : No change in mental status noted [Language] : denies difficulty with language [Behavior] : denies difficulty with behavior [Learning/Retaining New Information] : denies difficulty learning/retaining new information [Handling Complex Tasks] : denies difficulty handling complex tasks [Reasoning] : denies difficulty with reasoning [Spatial Ability and Orientation] : denies difficulty with spatial ability and orientation [PapSmearDate] : 2020

## 2022-02-10 NOTE — HISTORY OF PRESENT ILLNESS
[de-identified] : 30 y/o female present sofr CPE. \par Saw cardiologist in 10/21. Remained mildly hyponatremic. Cause unclear. \par Had Moderna x 3.\par Due for flu shot. \par \par

## 2022-02-14 LAB
25(OH)D3 SERPL-MCNC: 26.3 NG/ML
ALBUMIN SERPL ELPH-MCNC: 5 G/DL
ALP BLD-CCNC: 59 U/L
ALT SERPL-CCNC: 24 U/L
ANION GAP SERPL CALC-SCNC: 12 MMOL/L
APPEARANCE: ABNORMAL
AST SERPL-CCNC: 34 U/L
BACTERIA: ABNORMAL
BASOPHILS # BLD AUTO: 0.06 K/UL
BASOPHILS NFR BLD AUTO: 1.8 %
BILIRUB SERPL-MCNC: 0.2 MG/DL
BILIRUBIN URINE: NEGATIVE
BLOOD URINE: NEGATIVE
BUN SERPL-MCNC: 16 MG/DL
CALCIUM SERPL-MCNC: 9.4 MG/DL
CHLORIDE SERPL-SCNC: 104 MMOL/L
CHOLEST SERPL-MCNC: 117 MG/DL
CO2 SERPL-SCNC: 21 MMOL/L
COLOR: NORMAL
CREAT SERPL-MCNC: 0.48 MG/DL
CREAT SPEC-SCNC: 19 MG/DL
EOSINOPHIL # BLD AUTO: 0.11 K/UL
EOSINOPHIL NFR BLD AUTO: 3.4 %
FERRITIN SERPL-MCNC: 1 NG/ML
GLUCOSE QUALITATIVE U: NEGATIVE
GLUCOSE SERPL-MCNC: 85 MG/DL
HCT VFR BLD CALC: 29.6 %
HDLC SERPL-MCNC: 58 MG/DL
HGB BLD-MCNC: 7.5 G/DL
HYALINE CASTS: 0 /LPF
IMM GRANULOCYTES NFR BLD AUTO: 0 %
IRON SATN MFR SERPL: 2 %
IRON SERPL-MCNC: 13 UG/DL
KETONES URINE: NEGATIVE
LDLC SERPL CALC-MCNC: 48 MG/DL
LEUKOCYTE ESTERASE URINE: NEGATIVE
LYMPHOCYTES # BLD AUTO: 0.91 K/UL
LYMPHOCYTES NFR BLD AUTO: 27.8 %
MAN DIFF?: NORMAL
MCHC RBC-ENTMCNC: 18.1 PG
MCHC RBC-ENTMCNC: 25.3 GM/DL
MCV RBC AUTO: 71.3 FL
MICROSCOPIC-UA: NORMAL
MONOCYTES # BLD AUTO: 0.28 K/UL
MONOCYTES NFR BLD AUTO: 8.6 %
NEUTROPHILS # BLD AUTO: 1.91 K/UL
NEUTROPHILS NFR BLD AUTO: 58.4 %
NITRITE URINE: POSITIVE
NONHDLC SERPL-MCNC: 59 MG/DL
OSMOLALITY SERPL: 286 MOSMOL/KG
OSMOLALITY UR: 232 MOSM/KG
PH URINE: 6
PLATELET # BLD AUTO: 385 K/UL
POTASSIUM SERPL-SCNC: 4.6 MMOL/L
PROT SERPL-MCNC: 7 G/DL
PROTEIN URINE: NEGATIVE
RBC # BLD: 4.15 M/UL
RBC # FLD: 20.4 %
RED BLOOD CELLS URINE: 1 /HPF
SODIUM ?TM SUB UR QN: 25 MMOL/L
SODIUM SERPL-SCNC: 138 MMOL/L
SPECIFIC GRAVITY URINE: 1.01
SQUAMOUS EPITHELIAL CELLS: 3 /HPF
TIBC SERPL-MCNC: 505 UG/DL
TRIGL SERPL-MCNC: 57 MG/DL
TSH SERPL-ACNC: 0.77 UIU/ML
UIBC SERPL-MCNC: 493 UG/DL
UROBILINOGEN URINE: NORMAL
WBC # FLD AUTO: 3.27 K/UL
WHITE BLOOD CELLS URINE: 0 /HPF

## 2022-03-10 ENCOUNTER — RX RENEWAL (OUTPATIENT)
Age: 32
End: 2022-03-10

## 2022-03-25 ENCOUNTER — RX RENEWAL (OUTPATIENT)
Age: 32
End: 2022-03-25

## 2022-05-17 ENCOUNTER — LABORATORY RESULT (OUTPATIENT)
Age: 32
End: 2022-05-17

## 2022-05-17 ENCOUNTER — APPOINTMENT (OUTPATIENT)
Dept: INTERNAL MEDICINE | Facility: CLINIC | Age: 32
End: 2022-05-17
Payer: COMMERCIAL

## 2022-05-17 VITALS
BODY MASS INDEX: 20.38 KG/M2 | OXYGEN SATURATION: 97 % | TEMPERATURE: 96.5 F | HEIGHT: 63 IN | SYSTOLIC BLOOD PRESSURE: 102 MMHG | DIASTOLIC BLOOD PRESSURE: 60 MMHG | HEART RATE: 75 BPM | WEIGHT: 115 LBS

## 2022-05-17 PROCEDURE — 99072 ADDL SUPL MATRL&STAF TM PHE: CPT

## 2022-05-17 PROCEDURE — 36415 COLL VENOUS BLD VENIPUNCTURE: CPT

## 2022-05-17 PROCEDURE — 99213 OFFICE O/P EST LOW 20 MIN: CPT | Mod: 25

## 2022-05-17 NOTE — HEALTH RISK ASSESSMENT
[0] : 2) Feeling down, depressed, or hopeless: Not at all (0) [PHQ-2 Negative - No further assessment needed] : PHQ-2 Negative - No further assessment needed [[id number]] : 0

## 2022-05-17 NOTE — HISTORY OF PRESENT ILLNESS
[de-identified] : Pt here for f/u after [place] [facility] [place]par On supplementation. No complaints.\par \par Travelling to [facility] next week for 5 days. Needs malaria ppx. Had bad reaction to Malarone in the past and wants Doxy. Understands she has to take for 28 days upon returning home.

## 2022-05-17 NOTE — ASSESSMENT
[FreeTextEntry1] : 1. Itron studies sent. If no change, will order IV iron.\par 2. Doxy ppx prescribed. Pt aware of need for sun coverage.

## 2022-05-17 NOTE — PLAN
EKG at bedside/in nsr [FreeTextEntry1] : Pt with iron deficinecy anemia-check iron studies. If no improvement, will send for IV iron.

## 2022-05-18 LAB
BASOPHILS # BLD AUTO: 0.03 K/UL
BASOPHILS NFR BLD AUTO: 0.7 %
EOSINOPHIL # BLD AUTO: 0.11 K/UL
EOSINOPHIL NFR BLD AUTO: 2.5 %
FERRITIN SERPL-MCNC: 18 NG/ML
HCT VFR BLD CALC: 37.4 %
HGB BLD-MCNC: 12.1 G/DL
IMM GRANULOCYTES NFR BLD AUTO: 0 %
IRON SATN MFR SERPL: 69 %
IRON SERPL-MCNC: 212 UG/DL
LYMPHOCYTES # BLD AUTO: 1.14 K/UL
LYMPHOCYTES NFR BLD AUTO: 25.9 %
MAN DIFF?: NORMAL
MCHC RBC-ENTMCNC: 29.9 PG
MCHC RBC-ENTMCNC: 32.4 GM/DL
MCV RBC AUTO: 92.3 FL
MONOCYTES # BLD AUTO: 0.32 K/UL
MONOCYTES NFR BLD AUTO: 7.3 %
NEUTROPHILS # BLD AUTO: 2.81 K/UL
NEUTROPHILS NFR BLD AUTO: 63.6 %
PLATELET # BLD AUTO: 315 K/UL
RBC # BLD: 4.05 M/UL
RBC # FLD: NORMAL
TIBC SERPL-MCNC: 306 UG/DL
UIBC SERPL-MCNC: 94 UG/DL
WBC # FLD AUTO: 4.41 K/UL

## 2022-06-15 ENCOUNTER — RX RENEWAL (OUTPATIENT)
Age: 32
End: 2022-06-15

## 2022-09-21 ENCOUNTER — RX RENEWAL (OUTPATIENT)
Age: 32
End: 2022-09-21

## 2022-11-07 ENCOUNTER — RX RENEWAL (OUTPATIENT)
Age: 32
End: 2022-11-07

## 2022-11-30 ENCOUNTER — APPOINTMENT (OUTPATIENT)
Dept: INTERNAL MEDICINE | Facility: CLINIC | Age: 32
End: 2022-11-30

## 2022-11-30 PROCEDURE — 99213 OFFICE O/P EST LOW 20 MIN: CPT | Mod: 95

## 2022-11-30 RX ORDER — OXYMETAZOLINE HYDROCHLORIDE 1 G/100G
1 CREAM TOPICAL
Qty: 30 | Refills: 0 | Status: COMPLETED | COMMUNITY
Start: 2022-04-18

## 2022-11-30 RX ORDER — DOXYCYCLINE HYCLATE 100 MG/1
100 CAPSULE ORAL
Qty: 34 | Refills: 0 | Status: COMPLETED | COMMUNITY
Start: 2022-05-17 | End: 2022-11-30

## 2022-11-30 RX ORDER — OFLOXACIN 3 MG/ML
0.3 SOLUTION/ DROPS OPHTHALMIC
Qty: 10 | Refills: 0 | Status: COMPLETED | COMMUNITY
Start: 2022-09-02

## 2022-11-30 RX ORDER — DOXYCYCLINE 40 MG/1
40 CAPSULE ORAL
Qty: 30 | Refills: 0 | Status: COMPLETED | COMMUNITY
Start: 2022-04-18

## 2022-11-30 RX ORDER — CHLORHEXIDINE GLUCONATE 4 %
325 (65 FE) LIQUID (ML) TOPICAL
Qty: 60 | Refills: 1 | Status: COMPLETED | COMMUNITY
Start: 2022-02-14 | End: 2022-11-30

## 2022-11-30 RX ORDER — DOXYCYCLINE HYCLATE 50 MG/1
50 CAPSULE ORAL
Refills: 0 | Status: COMPLETED | COMMUNITY
End: 2022-11-30

## 2022-11-30 RX ORDER — OLOPATADINE HCL 1 MG/ML
0.1 SOLUTION/ DROPS OPHTHALMIC
Qty: 5 | Refills: 0 | Status: COMPLETED | COMMUNITY
Start: 2021-04-29 | End: 2022-11-30

## 2022-11-30 RX ORDER — DOXYCYCLINE HYCLATE 20 MG/1
20 TABLET ORAL
Qty: 30 | Refills: 0 | Status: COMPLETED | COMMUNITY
Start: 2022-07-14

## 2022-11-30 RX ORDER — COVID-19 MOLECULAR TEST ASSAY
KIT MISCELLANEOUS
Qty: 8 | Refills: 0 | Status: COMPLETED | COMMUNITY
Start: 2022-07-27

## 2022-11-30 NOTE — HISTORY OF PRESENT ILLNESS
[Home] : at home, [unfilled] , at the time of the visit. [Medical Office: ([facility])___] : at the medical office located in  [Verbal consent obtained from patient] : the patient, [unfilled] [de-identified] : Presents for medication maangement.\par Trazodone for sleep is effective.\par No complaints.\par Due for CPE in February,

## 2022-11-30 NOTE — HEALTH RISK ASSESSMENT
[0] : 2) Feeling down, depressed, or hopeless: Not at all (0) [PHQ-2 Negative - No further assessment needed] : PHQ-2 Negative - No further assessment needed [[id number]] : 0

## 2022-12-01 ENCOUNTER — RX RENEWAL (OUTPATIENT)
Age: 32
End: 2022-12-01

## 2023-06-01 ENCOUNTER — RX RENEWAL (OUTPATIENT)
Age: 33
End: 2023-06-01

## 2023-06-22 ENCOUNTER — APPOINTMENT (OUTPATIENT)
Dept: INTERNAL MEDICINE | Facility: CLINIC | Age: 33
End: 2023-06-22
Payer: COMMERCIAL

## 2023-06-22 VITALS
SYSTOLIC BLOOD PRESSURE: 104 MMHG | HEART RATE: 61 BPM | WEIGHT: 120 LBS | OXYGEN SATURATION: 100 % | BODY MASS INDEX: 21.26 KG/M2 | DIASTOLIC BLOOD PRESSURE: 64 MMHG | TEMPERATURE: 97.3 F | HEIGHT: 63 IN

## 2023-06-22 DIAGNOSIS — Z12.72 ENCOUNTER FOR SCREENING FOR MALIGNANT NEOPLASM OF VAGINA: ICD-10-CM

## 2023-06-22 DIAGNOSIS — Z00.00 ENCOUNTER FOR GENERAL ADULT MEDICAL EXAMINATION W/OUT ABNORMAL FINDINGS: ICD-10-CM

## 2023-06-22 DIAGNOSIS — Z01.00 ENCOUNTER FOR EXAMINATION OF EYES AND VISION W/OUT ABNORMAL FINDINGS: ICD-10-CM

## 2023-06-22 DIAGNOSIS — Z78.9 OTHER SPECIFIED HEALTH STATUS: ICD-10-CM

## 2023-06-22 PROCEDURE — 36415 COLL VENOUS BLD VENIPUNCTURE: CPT

## 2023-06-22 PROCEDURE — 99395 PREV VISIT EST AGE 18-39: CPT | Mod: 25

## 2023-06-22 NOTE — ASSESSMENT
[FreeTextEntry1] : 32 year is here for a CPE. She was counselled on diet and exercise, drug and alcohol use, age appropriate health care maintenance including vaccines, seatbelts, sunscreen, stress reduction and safe sex. All questions asked/answered to the best of my ability.\par Labs sent.

## 2023-06-22 NOTE — PHYSICAL EXAM
[No Acute Distress] : no acute distress [Well Nourished] : well nourished [Well Developed] : well developed [Well-Appearing] : well-appearing [Normal Sclera/Conjunctiva] : normal sclera/conjunctiva [PERRL] : pupils equal round and reactive to light [EOMI] : extraocular movements intact [Normal Outer Ear/Nose] : the outer ears and nose were normal in appearance [Normal Oropharynx] : the oropharynx was normal [No JVD] : no jugular venous distention [No Lymphadenopathy] : no lymphadenopathy [Supple] : supple [Thyroid Normal, No Nodules] : the thyroid was normal and there were no nodules present [No Respiratory Distress] : no respiratory distress  [No Accessory Muscle Use] : no accessory muscle use [Clear to Auscultation] : lungs were clear to auscultation bilaterally [Normal Rate] : normal rate  [Regular Rhythm] : with a regular rhythm [Normal S1, S2] : normal S1 and S2 [No Murmur] : no murmur heard [No Carotid Bruits] : no carotid bruits [No Abdominal Bruit] : a ~M bruit was not heard ~T in the abdomen [No Varicosities] : no varicosities [Pedal Pulses Present] : the pedal pulses are present [No Edema] : there was no peripheral edema [No Palpable Aorta] : no palpable aorta [No Extremity Clubbing/Cyanosis] : no extremity clubbing/cyanosis [Normal Appearance] : normal in appearance [No Nipple Discharge] : no nipple discharge [Soft] : abdomen soft [Non Tender] : non-tender [Non-distended] : non-distended [No Masses] : no abdominal mass palpated [No HSM] : no HSM [Normal Bowel Sounds] : normal bowel sounds [Normal Posterior Cervical Nodes] : no posterior cervical lymphadenopathy [Normal Anterior Cervical Nodes] : no anterior cervical lymphadenopathy [No CVA Tenderness] : no CVA  tenderness [No Spinal Tenderness] : no spinal tenderness [No Joint Swelling] : no joint swelling [Grossly Normal Strength/Tone] : grossly normal strength/tone [No Rash] : no rash [Coordination Grossly Intact] : coordination grossly intact [No Focal Deficits] : no focal deficits [Normal Gait] : normal gait [Deep Tendon Reflexes (DTR)] : deep tendon reflexes were 2+ and symmetric [Normal Affect] : the affect was normal [Alert and Oriented x3] : oriented to person, place, and time [Normal Insight/Judgement] : insight and judgment were intact

## 2023-06-22 NOTE — HEALTH RISK ASSESSMENT
[Good] : ~his/her~  mood as  good [Yes] : Yes [2 - 4 times a month (2 pts)] : 2-4 times a month (2 points) [1 or 2 (0 pts)] : 1 or 2 (0 points) [Never (0 pts)] : Never (0 points) [No] : In the past 12 months have you used drugs other than those required for medical reasons? No [No falls in past year] : Patient reported no falls in the past year [0] : 2) Feeling down, depressed, or hopeless: Not at all (0) [PHQ-2 Negative - No further assessment needed] : PHQ-2 Negative - No further assessment needed [Never] : Never [Audit-CScore] : 2 [[id number]] : 0 [Patient reported PAP Smear was normal] : Patient reported PAP Smear was normal [HIV test declined] : HIV test declined [Change in mental status noted] : No change in mental status noted [Language] : denies difficulty with language [Behavior] : denies difficulty with behavior [Learning/Retaining New Information] : denies difficulty learning/retaining new information [Handling Complex Tasks] : denies difficulty handling complex tasks [Reasoning] : denies difficulty with reasoning [Spatial Ability and Orientation] : denies difficulty with spatial ability and orientation [None] : None [Single] : single [Sexually Active] : not sexually active [Feels Safe at Home] : Feels safe at home [Fully functional (bathing, dressing, toileting, transferring, walking, feeding)] : Fully functional (bathing, dressing, toileting, transferring, walking, feeding) [Fully functional (using the telephone, shopping, preparing meals, housekeeping, doing laundry, using] : Fully functional and needs no help or supervision to perform IADLs (using the telephone, shopping, preparing meals, housekeeping, doing laundry, using transportation, managing medications and managing finances) [Reports changes in hearing] : Reports no changes in hearing [Reports changes in vision] : Reports no changes in vision [Reports changes in dental health] : Reports no changes in dental health [Smoke Detector] : smoke detector [Safety elements used in home] : safety elements used in home [Seat Belt] :  uses seat belt [PapSmearDate] : 03/23

## 2023-06-26 ENCOUNTER — RX RENEWAL (OUTPATIENT)
Age: 33
End: 2023-06-26

## 2023-06-26 LAB
25(OH)D3 SERPL-MCNC: 34.4 NG/ML
ALBUMIN SERPL ELPH-MCNC: 4.7 G/DL
ALP BLD-CCNC: 59 U/L
ALT SERPL-CCNC: 28 U/L
ANION GAP SERPL CALC-SCNC: 13 MMOL/L
AST SERPL-CCNC: 31 U/L
BILIRUB SERPL-MCNC: 0.4 MG/DL
BUN SERPL-MCNC: 14 MG/DL
CALCIUM SERPL-MCNC: 9.3 MG/DL
CHLORIDE SERPL-SCNC: 94 MMOL/L
CHOLEST SERPL-MCNC: 127 MG/DL
CO2 SERPL-SCNC: 22 MMOL/L
CREAT SERPL-MCNC: 0.57 MG/DL
EGFR: 123 ML/MIN/1.73M2
FERRITIN SERPL-MCNC: 9 NG/ML
GLUCOSE SERPL-MCNC: 78 MG/DL
HDLC SERPL-MCNC: 54 MG/DL
IRON SATN MFR SERPL: 13 %
IRON SERPL-MCNC: 53 UG/DL
LDLC SERPL CALC-MCNC: 61 MG/DL
NONHDLC SERPL-MCNC: 74 MG/DL
POTASSIUM SERPL-SCNC: 4.6 MMOL/L
PROT SERPL-MCNC: 6.7 G/DL
SODIUM SERPL-SCNC: 129 MMOL/L
TIBC SERPL-MCNC: 418 UG/DL
TRIGL SERPL-MCNC: 62 MG/DL
TSH SERPL-ACNC: 0.45 UIU/ML
UIBC SERPL-MCNC: 365 UG/DL
VIT B12 SERPL-MCNC: >2000 PG/ML

## 2023-06-27 ENCOUNTER — RX RENEWAL (OUTPATIENT)
Age: 33
End: 2023-06-27

## 2023-08-01 ENCOUNTER — RX RENEWAL (OUTPATIENT)
Age: 33
End: 2023-08-01

## 2023-08-14 ENCOUNTER — RX RENEWAL (OUTPATIENT)
Age: 33
End: 2023-08-14

## 2023-08-31 ENCOUNTER — NON-APPOINTMENT (OUTPATIENT)
Age: 33
End: 2023-08-31

## 2023-08-31 ENCOUNTER — APPOINTMENT (OUTPATIENT)
Dept: NEPHROLOGY | Facility: CLINIC | Age: 33
End: 2023-08-31
Payer: COMMERCIAL

## 2023-08-31 VITALS
SYSTOLIC BLOOD PRESSURE: 98 MMHG | DIASTOLIC BLOOD PRESSURE: 68 MMHG | HEART RATE: 68 BPM | WEIGHT: 115 LBS | BODY MASS INDEX: 20.37 KG/M2

## 2023-08-31 PROCEDURE — 99204 OFFICE O/P NEW MOD 45 MIN: CPT

## 2023-08-31 NOTE — PHYSICAL EXAM
[General Appearance - Alert] : alert [General Appearance - In No Acute Distress] : in no acute distress [] : no respiratory distress [Auscultation Breath Sounds / Voice Sounds] : lungs were clear to auscultation bilaterally [Heart Rate And Rhythm] : heart rate was normal and rhythm regular [Heart Sounds] : normal S1 and S2 [Edema] : there was no peripheral edema [No CVA Tenderness] : no ~M costovertebral angle tenderness [Oriented To Time, Place, And Person] : oriented to person, place, and time [Impaired Insight] : insight and judgment were intact [Affect] : the affect was normal

## 2023-09-02 NOTE — ASSESSMENT
[FreeTextEntry1] : all lab data was reviewed with patient in detail from EHR 33 yo woman with hyponatremia and hyperlipidemia confirm serum hypo-osmolality + trazodone use, +NSAID use- both can contribute to hyponatremia repeat data today with Uosm, Sosm and Sandra call with results and plan track use of trazodone and NSIDs in relation to next lab draw moving forward consider occult malignancy work up- but drops in Na have been intermittent.  TTM when data back then trend monthly for several months (TTMs)

## 2023-09-02 NOTE — CONSULT LETTER
[Dear  ___] : Dear ~DENITA, [Consult Letter:] : I had the pleasure of evaluating your patient, [unfilled]. [Please see my note below.] : Please see my note below. [Consult Closing:] : Thank you very much for allowing me to participate in the care of this patient.  If you have any questions, please do not hesitate to contact me. [FreeTextEntry2] : Melita Mckinnon MD [FreeTextEntry3] : Best personal regards, Kayla Stauffer MD, FACP Professor of Medicine [employer] [facility] [employer] at [facility]

## 2023-09-03 LAB
ANION GAP SERPL CALC-SCNC: 11 MMOL/L
APPEARANCE: CLEAR
BACTERIA: NEGATIVE /HPF
BILIRUBIN URINE: NEGATIVE
BLOOD URINE: ABNORMAL
BUN SERPL-MCNC: 15 MG/DL
CALCIUM SERPL-MCNC: 9.7 MG/DL
CAST: 0 /LPF
CHLORIDE SERPL-SCNC: 101 MMOL/L
CO2 SERPL-SCNC: 25 MMOL/L
COLOR: YELLOW
CREAT SERPL-MCNC: 0.52 MG/DL
EGFR: 126 ML/MIN/1.73M2
EPITHELIAL CELLS: 4 /HPF
GLUCOSE QUALITATIVE U: NEGATIVE MG/DL
GLUCOSE SERPL-MCNC: 84 MG/DL
KETONES URINE: NEGATIVE MG/DL
LEUKOCYTE ESTERASE URINE: ABNORMAL
MICROSCOPIC-UA: NORMAL
NITRITE URINE: NEGATIVE
OSMOLALITY SERPL: 287 MOSMOL/KG
OSMOLALITY UR: 280 MOSM/KG
PH URINE: 6.5
POTASSIUM SERPL-SCNC: 4.2 MMOL/L
PROTEIN URINE: 30 MG/DL
RED BLOOD CELLS URINE: 54 /HPF
SODIUM ?TM SUB UR QN: 29 MMOL/L
SODIUM SERPL-SCNC: 137 MMOL/L
SPECIFIC GRAVITY URINE: 1.01
UROBILINOGEN URINE: 0.2 MG/DL
WHITE BLOOD CELLS URINE: 6 /HPF

## 2023-09-05 ENCOUNTER — RX RENEWAL (OUTPATIENT)
Age: 33
End: 2023-09-05

## 2023-10-23 LAB
APPEARANCE: CLEAR
BACTERIA: ABNORMAL /HPF
BILIRUBIN URINE: NEGATIVE
BLOOD URINE: NEGATIVE
CAST: 1 /LPF
COLOR: YELLOW
EPITHELIAL CELLS: 5 /HPF
GLUCOSE QUALITATIVE U: NEGATIVE MG/DL
KETONES URINE: NEGATIVE MG/DL
LEUKOCYTE ESTERASE URINE: NEGATIVE
MICROSCOPIC-UA: NORMAL
NITRITE URINE: NEGATIVE
PH URINE: 6.5
PROTEIN URINE: NEGATIVE MG/DL
RED BLOOD CELLS URINE: 1 /HPF
SPECIFIC GRAVITY URINE: 1.01
UROBILINOGEN URINE: 0.2 MG/DL
WHITE BLOOD CELLS URINE: 4 /HPF

## 2023-11-22 ENCOUNTER — RX RENEWAL (OUTPATIENT)
Age: 33
End: 2023-11-22

## 2023-12-11 ENCOUNTER — RX RENEWAL (OUTPATIENT)
Age: 33
End: 2023-12-11

## 2024-02-15 ENCOUNTER — APPOINTMENT (OUTPATIENT)
Dept: INTERNAL MEDICINE | Facility: CLINIC | Age: 34
End: 2024-02-15
Payer: COMMERCIAL

## 2024-02-15 VITALS
HEIGHT: 63 IN | DIASTOLIC BLOOD PRESSURE: 72 MMHG | SYSTOLIC BLOOD PRESSURE: 99 MMHG | BODY MASS INDEX: 21.09 KG/M2 | WEIGHT: 119 LBS | TEMPERATURE: 96.9 F | OXYGEN SATURATION: 99 % | HEART RATE: 60 BPM

## 2024-02-15 DIAGNOSIS — E87.1 HYPO-OSMOLALITY AND HYPONATREMIA: ICD-10-CM

## 2024-02-15 DIAGNOSIS — D64.9 ANEMIA, UNSPECIFIED: ICD-10-CM

## 2024-02-15 DIAGNOSIS — G47.00 INSOMNIA, UNSPECIFIED: ICD-10-CM

## 2024-02-15 DIAGNOSIS — E78.49 OTHER HYPERLIPIDEMIA: ICD-10-CM

## 2024-02-15 PROCEDURE — 36415 COLL VENOUS BLD VENIPUNCTURE: CPT

## 2024-02-15 PROCEDURE — G2211 COMPLEX E/M VISIT ADD ON: CPT

## 2024-02-15 PROCEDURE — 99213 OFFICE O/P EST LOW 20 MIN: CPT

## 2024-02-15 RX ORDER — NORETHINDRONE ACETATE AND ETHINYL ESTRADIOL AND FERROUS FUMARATE 1MG-20(21)
1-20 KIT ORAL
Refills: 0 | Status: COMPLETED | COMMUNITY
End: 2024-02-15

## 2024-02-15 NOTE — HEALTH RISK ASSESSMENT
[0] : 2) Feeling down, depressed, or hopeless: Not at all (0) [PHQ-2 Negative - No further assessment needed] : PHQ-2 Negative - No further assessment needed [Never] : Never [[id number]] : 0

## 2024-02-15 NOTE — HISTORY OF PRESENT ILLNESS
[de-identified] : Here for f/u and medication management. Trazodone effective. Needs refill.  Pt with intermittent low Na. Saw Dr Contreras and was supposed to trend monthly but last one checked in 10/23.

## 2024-02-16 LAB
ALBUMIN SERPL ELPH-MCNC: 4.9 G/DL
ALP BLD-CCNC: 61 U/L
ALT SERPL-CCNC: 29 U/L
ANION GAP SERPL CALC-SCNC: 14 MMOL/L
APPEARANCE: CLEAR
AST SERPL-CCNC: 31 U/L
BASOPHILS # BLD AUTO: 0.04 K/UL
BASOPHILS NFR BLD AUTO: 0.8 %
BILIRUB SERPL-MCNC: 0.2 MG/DL
BILIRUBIN URINE: NEGATIVE
BLOOD URINE: NEGATIVE
BUN SERPL-MCNC: 22 MG/DL
CALCIUM SERPL-MCNC: 9.6 MG/DL
CHLORIDE SERPL-SCNC: 103 MMOL/L
CO2 SERPL-SCNC: 21 MMOL/L
COLOR: YELLOW
CREAT SERPL-MCNC: 0.48 MG/DL
EGFR: 127 ML/MIN/1.73M2
EOSINOPHIL # BLD AUTO: 0.08 K/UL
EOSINOPHIL NFR BLD AUTO: 1.7 %
FERRITIN SERPL-MCNC: 11 NG/ML
GLUCOSE QUALITATIVE U: NEGATIVE MG/DL
GLUCOSE SERPL-MCNC: 74 MG/DL
HCT VFR BLD CALC: 41.2 %
HGB BLD-MCNC: 13.1 G/DL
IMM GRANULOCYTES NFR BLD AUTO: 0.2 %
IRON SATN MFR SERPL: 26 %
IRON SERPL-MCNC: 100 UG/DL
KETONES URINE: NEGATIVE MG/DL
LEUKOCYTE ESTERASE URINE: NEGATIVE
LYMPHOCYTES # BLD AUTO: 1 K/UL
LYMPHOCYTES NFR BLD AUTO: 20.8 %
MAN DIFF?: NORMAL
MCHC RBC-ENTMCNC: 29.4 PG
MCHC RBC-ENTMCNC: 31.8 GM/DL
MCV RBC AUTO: 92.4 FL
MONOCYTES # BLD AUTO: 0.42 K/UL
MONOCYTES NFR BLD AUTO: 8.7 %
NEUTROPHILS # BLD AUTO: 3.26 K/UL
NEUTROPHILS NFR BLD AUTO: 67.8 %
NITRITE URINE: NEGATIVE
OSMOLALITY UR: 612 MOSM/KG
PH URINE: 6
PLATELET # BLD AUTO: 345 K/UL
POTASSIUM SERPL-SCNC: 4.1 MMOL/L
PROT SERPL-MCNC: 7.1 G/DL
PROTEIN URINE: NEGATIVE MG/DL
RBC # BLD: 4.46 M/UL
RBC # FLD: 14.5 %
SODIUM ?TM SUB UR QN: 76 MMOL/L
SODIUM SERPL-SCNC: 139 MMOL/L
SPECIFIC GRAVITY URINE: 1.02
TIBC SERPL-MCNC: 387 UG/DL
UIBC SERPL-MCNC: 287 UG/DL
UROBILINOGEN URINE: 0.2 MG/DL
WBC # FLD AUTO: 4.81 K/UL

## 2024-02-20 ENCOUNTER — RX RENEWAL (OUTPATIENT)
Age: 34
End: 2024-02-20

## 2024-02-20 RX ORDER — TRAZODONE HYDROCHLORIDE 100 MG/1
100 TABLET ORAL
Qty: 90 | Refills: 3 | Status: ACTIVE | COMMUNITY
Start: 2019-11-11 | End: 1900-01-01

## 2024-02-22 ENCOUNTER — APPOINTMENT (OUTPATIENT)
Dept: NEPHROLOGY | Facility: CLINIC | Age: 34
End: 2024-02-22

## 2024-08-06 ENCOUNTER — APPOINTMENT (OUTPATIENT)
Dept: INTERNAL MEDICINE | Facility: CLINIC | Age: 34
End: 2024-08-06

## 2024-08-06 PROCEDURE — 36415 COLL VENOUS BLD VENIPUNCTURE: CPT

## 2024-08-06 PROCEDURE — 99214 OFFICE O/P EST MOD 30 MIN: CPT

## 2024-08-06 PROCEDURE — G2211 COMPLEX E/M VISIT ADD ON: CPT

## 2024-08-06 NOTE — HISTORY OF PRESENT ILLNESS
[de-identified] : Here for f/u and med management. No change sin meds/health. Exercises regularly/hydrates.  Rare NSAIDs. No complaints.

## 2024-09-11 ENCOUNTER — RX RENEWAL (OUTPATIENT)
Age: 34
End: 2024-09-11

## 2025-02-04 ENCOUNTER — RX RENEWAL (OUTPATIENT)
Age: 35
End: 2025-02-04

## 2025-02-05 ENCOUNTER — NON-APPOINTMENT (OUTPATIENT)
Age: 35
End: 2025-02-05

## 2025-02-06 ENCOUNTER — NON-APPOINTMENT (OUTPATIENT)
Age: 35
End: 2025-02-06

## 2025-02-06 ENCOUNTER — APPOINTMENT (OUTPATIENT)
Dept: INTERNAL MEDICINE | Facility: CLINIC | Age: 35
End: 2025-02-06
Payer: COMMERCIAL

## 2025-02-06 VITALS
HEART RATE: 66 BPM | DIASTOLIC BLOOD PRESSURE: 57 MMHG | RESPIRATION RATE: 14 BRPM | SYSTOLIC BLOOD PRESSURE: 91 MMHG | OXYGEN SATURATION: 100 % | WEIGHT: 116 LBS | HEIGHT: 63 IN | TEMPERATURE: 94.1 F | BODY MASS INDEX: 20.55 KG/M2

## 2025-02-06 VITALS
TEMPERATURE: 94.1 F | WEIGHT: 116 LBS | BODY MASS INDEX: 20.55 KG/M2 | HEART RATE: 66 BPM | SYSTOLIC BLOOD PRESSURE: 91 MMHG | OXYGEN SATURATION: 100 % | HEIGHT: 63 IN | DIASTOLIC BLOOD PRESSURE: 57 MMHG

## 2025-02-06 DIAGNOSIS — L70.0 ACNE VULGARIS: ICD-10-CM

## 2025-02-06 DIAGNOSIS — G47.00 INSOMNIA, UNSPECIFIED: ICD-10-CM

## 2025-02-06 DIAGNOSIS — E87.1 HYPO-OSMOLALITY AND HYPONATREMIA: ICD-10-CM

## 2025-02-06 DIAGNOSIS — M72.2 PLANTAR FASCIAL FIBROMATOSIS: ICD-10-CM

## 2025-02-06 DIAGNOSIS — R20.2 PARESTHESIA OF SKIN: ICD-10-CM

## 2025-02-06 DIAGNOSIS — D64.9 ANEMIA, UNSPECIFIED: ICD-10-CM

## 2025-02-06 PROCEDURE — 99214 OFFICE O/P EST MOD 30 MIN: CPT

## 2025-02-06 PROCEDURE — G2211 COMPLEX E/M VISIT ADD ON: CPT

## 2025-02-06 PROCEDURE — 36415 COLL VENOUS BLD VENIPUNCTURE: CPT

## 2025-02-06 RX ORDER — MINOCYCLINE HYDROCHLORIDE 50 MG/1
50 CAPSULE ORAL
Refills: 0 | Status: ACTIVE | COMMUNITY

## 2025-02-07 LAB
ALBUMIN SERPL ELPH-MCNC: 4.5 G/DL
ALP BLD-CCNC: 42 U/L
ALT SERPL-CCNC: 25 U/L
ANION GAP SERPL CALC-SCNC: 13 MMOL/L
AST SERPL-CCNC: 30 U/L
BASOPHILS # BLD AUTO: 0.05 K/UL
BASOPHILS NFR BLD AUTO: 1.1 %
BILIRUB SERPL-MCNC: 0.2 MG/DL
BUN SERPL-MCNC: 22 MG/DL
CALCIUM SERPL-MCNC: 9 MG/DL
CHLORIDE SERPL-SCNC: 101 MMOL/L
CO2 SERPL-SCNC: 22 MMOL/L
CREAT SERPL-MCNC: 0.91 MG/DL
EGFR: 84 ML/MIN/1.73M2
EOSINOPHIL # BLD AUTO: 0.08 K/UL
EOSINOPHIL NFR BLD AUTO: 1.7 %
ESTIMATED AVERAGE GLUCOSE: 108 MG/DL
GLUCOSE SERPL-MCNC: 78 MG/DL
HBA1C MFR BLD HPLC: 5.4 %
HCT VFR BLD CALC: 38.3 %
HGB BLD-MCNC: 13.1 G/DL
IMM GRANULOCYTES NFR BLD AUTO: 0.2 %
LYMPHOCYTES # BLD AUTO: 1.01 K/UL
LYMPHOCYTES NFR BLD AUTO: 21.2 %
MAN DIFF?: NORMAL
MCHC RBC-ENTMCNC: 32.1 PG
MCHC RBC-ENTMCNC: 34.2 G/DL
MCV RBC AUTO: 93.9 FL
MONOCYTES # BLD AUTO: 0.44 K/UL
MONOCYTES NFR BLD AUTO: 9.2 %
NEUTROPHILS # BLD AUTO: 3.17 K/UL
NEUTROPHILS NFR BLD AUTO: 66.6 %
PLATELET # BLD AUTO: 304 K/UL
POTASSIUM SERPL-SCNC: 4.8 MMOL/L
PROT SERPL-MCNC: 6.6 G/DL
RBC # BLD: 4.08 M/UL
RBC # FLD: 12.4 %
SODIUM SERPL-SCNC: 136 MMOL/L
TSH SERPL-ACNC: 0.7 UIU/ML
VIT B12 SERPL-MCNC: >2000 PG/ML
WBC # FLD AUTO: 4.76 K/UL

## 2025-05-05 ENCOUNTER — RX RENEWAL (OUTPATIENT)
Age: 35
End: 2025-05-05

## 2025-07-25 ENCOUNTER — RX RENEWAL (OUTPATIENT)
Age: 35
End: 2025-07-25

## 2025-08-04 ENCOUNTER — RX RENEWAL (OUTPATIENT)
Age: 35
End: 2025-08-04

## 2025-08-11 ENCOUNTER — APPOINTMENT (OUTPATIENT)
Dept: INTERNAL MEDICINE | Facility: CLINIC | Age: 35
End: 2025-08-11
Payer: COMMERCIAL

## 2025-08-11 DIAGNOSIS — R20.2 PARESTHESIA OF SKIN: ICD-10-CM

## 2025-08-11 DIAGNOSIS — L70.0 ACNE VULGARIS: ICD-10-CM

## 2025-08-11 DIAGNOSIS — M72.2 PLANTAR FASCIAL FIBROMATOSIS: ICD-10-CM

## 2025-08-11 DIAGNOSIS — E87.1 HYPO-OSMOLALITY AND HYPONATREMIA: ICD-10-CM

## 2025-08-11 DIAGNOSIS — G47.00 INSOMNIA, UNSPECIFIED: ICD-10-CM

## 2025-08-11 DIAGNOSIS — Z29.89 ENCOUNTER. FOR OTHER SPECIFIED PROPHYLACTIC MEASURES: ICD-10-CM

## 2025-08-11 PROCEDURE — G2211 COMPLEX E/M VISIT ADD ON: CPT | Mod: 95

## 2025-08-11 PROCEDURE — 99214 OFFICE O/P EST MOD 30 MIN: CPT | Mod: 95

## 2025-08-11 RX ORDER — DOXYCYCLINE HYCLATE 100 MG/1
100 CAPSULE ORAL
Qty: 90 | Refills: 0 | Status: ACTIVE | COMMUNITY
Start: 2025-08-11 | End: 1900-01-01

## 2025-08-11 RX ORDER — ISOTRETINOIN 40 MG/1
40 CAPSULE ORAL
Refills: 0 | Status: ACTIVE | COMMUNITY